# Patient Record
Sex: FEMALE | Race: WHITE | NOT HISPANIC OR LATINO | Employment: FULL TIME | URBAN - METROPOLITAN AREA
[De-identification: names, ages, dates, MRNs, and addresses within clinical notes are randomized per-mention and may not be internally consistent; named-entity substitution may affect disease eponyms.]

---

## 2024-01-13 ENCOUNTER — ANCILLARY PROCEDURE (OUTPATIENT)
Dept: RADIOLOGY | Facility: CLINIC | Age: 49
End: 2024-01-13
Payer: COMMERCIAL

## 2024-01-13 VITALS — WEIGHT: 140 LBS | BODY MASS INDEX: 21.97 KG/M2 | HEIGHT: 67 IN

## 2024-01-13 DIAGNOSIS — Z12.31 ENCOUNTER FOR SCREENING MAMMOGRAM FOR MALIGNANT NEOPLASM OF BREAST: ICD-10-CM

## 2024-01-13 PROCEDURE — 77067 SCR MAMMO BI INCL CAD: CPT

## 2024-02-06 ENCOUNTER — OFFICE VISIT (OUTPATIENT)
Dept: OBSTETRICS AND GYNECOLOGY | Facility: CLINIC | Age: 49
End: 2024-02-06
Payer: COMMERCIAL

## 2024-02-06 VITALS
WEIGHT: 140 LBS | HEIGHT: 67 IN | DIASTOLIC BLOOD PRESSURE: 60 MMHG | BODY MASS INDEX: 21.97 KG/M2 | SYSTOLIC BLOOD PRESSURE: 102 MMHG

## 2024-02-06 DIAGNOSIS — Z01.419 WELL WOMAN EXAM: Primary | ICD-10-CM

## 2024-02-06 PROCEDURE — 99396 PREV VISIT EST AGE 40-64: CPT | Performed by: OBSTETRICS & GYNECOLOGY

## 2024-02-06 PROCEDURE — 1036F TOBACCO NON-USER: CPT | Performed by: OBSTETRICS & GYNECOLOGY

## 2024-02-06 NOTE — PROGRESS NOTES
Subjective   Patient ID: Xenia Ingram is a 48 y.o. female who presents for Well woman visit.  Results  CONVERTED GYN CYTOLOGY (Order [id number])  Result Information    Status Priority Source  Final result (2/13/2023 1400) Routine TPP CERVICAL-Include Genotype    CONVERTED GYN CYTOLOGY  Order: [id number]  Status: Final result       Visible to patient: Yes (seen)    0 Result Notes       1 HM Topic     Component 1 yr ago  Pathology Report     Accession #: [id number]     Date of Procedure:  1/31/2023      Pathologist: [facility], Cytology  Date Reported: 2/13/2023  Date Received:  1/31/2023  Submitting Physician: HOSEA ELY MD                    FINAL CYTOLOGICAL INTERPRETATION        A.  THINPREP PAP CERVICAL:              Specimen Adequacy:       SATISFACTORY FOR EVALUATION.       Quality Indicator: Absence of endocervical/transformation zone component.              General Categorization:       NEGATIVE FOR INTRAEPITHELIAL LESION OR MALIGNANCY.                            HIGH RISK HPV TEST RESULT:                      HPV GENOTYPE  16                      NEGATIVE       HPV GENOTYPE  18                      NEGATIVE       HPV GENOTYPE  OTHER             NEGATIVE              Reference Range: Negative                  Slide(s) initially screened by a Cytotechnologist at [facility], [address] [place] [address]    QC review performed at [facility], [address] [place] [address]    Testing for high-risk (HR) type of human papilloma virus (HPV) is performed by  the Roche romina HPV Test.  The romina HPV Test is a qualitative polymerase chain  reaction that amplifies DNA of HPV16, HPV18 and 12 other high-risk HPV types  (31, 33, 35, 39, 45, 51, 52, 56, 58, 59, 66, and 68) associated with cervical  cancer and its precursor lesions.  A positive result indicates the presence of  HPV DNA due to one or more of the 14 genotypes: 16, 18, 31,  33, 35, 39, 45, 51,  52, 56, 58, 59, 66, and 68. Negative results indicate HPV DNA concentrations  are undetectable or below the pre-set threshold for detection. False negative  results may be associated with unoptimized sampling. A negative HR HPV result  does not exclude the possibility of future cytologic HSIL or underlying CIN2-3  or cancer.    This test is approved for cervical specimens by  the US Food and Drug  Administration. Results of this test should be interpreted in conjunction with  the patient's Pap test results.  Please refer to ASCCP current guidelines for  the use of HPV DNA testing, result interpretation, and patient management.  The performance of this test was verified by the Molecular Diagnostic  Laboratory at [facility]. The lab is  certified under the Clinical Laboratory Amendments of 1988 (CLIA 88) as  qualified to perform high complexity clinical laboratory testing.    This specimen has been analyzed by the Bizimplyp Imaging System ([employer]  an automated imaging and review system, which assists the laboratory in  evaluating cells on ThinPrep Pap tests. Following automated imaging, selected  fields from every slide were reviewed by a cytotechnologist and/or pathologist.      Electronically Signed Out By [facility],  Cytology//JMD/SLD  By the signature on this report, the individual or group listed as making the  Final Interpretation/Diagnosis certifies that they have reviewed this case.  Diagnostic interpretation performed at [facility] [address] [facility] [address]  Educational Note:  Cervical cytology is a screening procedure primarily for squamous cancers and  precursors and has associated false-negative and false-positive results as  evidenced by published data.  Your patient's test should be interpreted in this  context, together with patient's history and clinical findings.  Regular  sampling and  follow-up of unexplained clinical signs and symptoms are  recommended to minimize false negative results.         Clinical History  Date of Last Menstrual Period:     none    Contraceptive History:  IUD  Other Clinical Conditions:  COTEST HPV(Genotype) except for ASC-H, HSIL, Carcinoma - Include HPV Genotype  testing    Clinical Diagnosis History: Cervical smear, as part of routine gynecological  examination - (Z01.419)   Source of Specimen  A: THINPREP PAP CERVICAL           [facility]  Department of Pathology  [id number] [place] [address]    BI mammo bilateral screening tomosynthesis  Status: Final result    Study Result    Narrative & Impression  Interpreted By:  Ernie Silva,   STUDY:  BI MAMMO BILATERAL SCREENING TOMOSYNTHESIS;  1/13/2024 10:10 am      ACCESSION NUMBER(S):  [id number]      ORDERING CLINICIAN:  HOSEA ELY      INDICATION:  Screening.      Based on the Tyrer-Cuzick model for breast cancer risk assessment,  the patient's lifetime risk of breast cancer is 11.1%.      COMPARISON:  2021, 2020, 2019, 2017      FINDINGS:  2D and tomosynthesis images were reviewed at 1 mm slice thickness.      Density:  The breast tissue is heterogeneously dense, which may  obscure small masses.      No suspicious masses or calcifications are identified.      IMPRESSION:  No mammographic evidence of malignancy.      BI-RADS CATEGORY:      BI-RADS Category:  1 Negative.  Recommendation:  Routine Screening Mammogram in 1 Year.  Recommended Date:  1 Year.  Laterality:  Bilateral.      For any future breast imaging appointments, please call [phone]).          MACRO:  None      Signed by: Ernie Silva 1/15/2024 11:08 AM  Dictation workstation:   [id number]    Established patient annual exam.  48 years old.  Healthy relationship.  She has daughter.  He has 2 children.    He is working in a dental office 7 years.  No meds not smoking family history  negative    Mirena since 2019.  Good till 2027.  No significant menopausal issues.  Last Pap 2023 was normal.  Next Pap 2028    Exam breast abdominal pelvic exams all normal.    Well woman exam.  Follow-up 1 year.  Pap from 2023 reviewed.  Recent mammogram normal.  Amenorrheic with Mirena          Review of Systems   All other systems reviewed and are negative.      Objective   Physical Exam  Constitutional:       Appearance: Normal appearance. She is normal weight.   Chest:   Breasts:     Right: Normal.   Genitourinary:     General: Normal vulva.      Vagina: Normal.      Cervix: Normal.      Uterus: Normal.       Adnexa: Right adnexa normal and left adnexa normal.   Neurological:      Mental Status: She is alert.         Assessment/Plan   Well woman exam.  Follow-up 1 year.  Annual mammography.  Mirena good till 2027.  Pap smear in 2028         Hernesto Sherman MD 02/06/24 10:27 AM

## 2024-08-07 ENCOUNTER — APPOINTMENT (OUTPATIENT)
Dept: RADIOLOGY | Facility: HOSPITAL | Age: 49
End: 2024-08-07
Payer: COMMERCIAL

## 2024-08-07 ENCOUNTER — HOSPITAL ENCOUNTER (EMERGENCY)
Facility: HOSPITAL | Age: 49
Discharge: HOME | End: 2024-08-07
Payer: COMMERCIAL

## 2024-08-07 VITALS
RESPIRATION RATE: 18 BRPM | SYSTOLIC BLOOD PRESSURE: 102 MMHG | TEMPERATURE: 98.8 F | DIASTOLIC BLOOD PRESSURE: 53 MMHG | OXYGEN SATURATION: 96 % | HEART RATE: 71 BPM | HEIGHT: 67 IN | WEIGHT: 140 LBS | BODY MASS INDEX: 21.97 KG/M2

## 2024-08-07 DIAGNOSIS — N20.0 KIDNEY STONE: ICD-10-CM

## 2024-08-07 DIAGNOSIS — R10.9 FLANK PAIN: Primary | ICD-10-CM

## 2024-08-07 LAB
ALBUMIN SERPL-MCNC: 4.4 G/DL (ref 3.5–5)
ALP BLD-CCNC: 81 U/L (ref 35–125)
ALT SERPL-CCNC: 27 U/L (ref 5–40)
ANION GAP SERPL CALC-SCNC: 14 MMOL/L
APPEARANCE UR: CLEAR
AST SERPL-CCNC: 27 U/L (ref 5–40)
BASOPHILS # BLD AUTO: 0.04 X10*3/UL (ref 0–0.1)
BASOPHILS NFR BLD AUTO: 0.5 %
BILIRUB SERPL-MCNC: 0.3 MG/DL (ref 0.1–1.2)
BILIRUB UR STRIP.AUTO-MCNC: NEGATIVE MG/DL
BUN SERPL-MCNC: 15 MG/DL (ref 8–25)
CALCIUM SERPL-MCNC: 10.3 MG/DL (ref 8.5–10.4)
CHLORIDE SERPL-SCNC: 103 MMOL/L (ref 97–107)
CO2 SERPL-SCNC: 26 MMOL/L (ref 24–31)
COLOR UR: ABNORMAL
CREAT SERPL-MCNC: 0.9 MG/DL (ref 0.4–1.6)
EGFRCR SERPLBLD CKD-EPI 2021: 79 ML/MIN/1.73M*2
EOSINOPHIL # BLD AUTO: 0.06 X10*3/UL (ref 0–0.7)
EOSINOPHIL NFR BLD AUTO: 0.8 %
ERYTHROCYTE [DISTWIDTH] IN BLOOD BY AUTOMATED COUNT: 12.8 % (ref 11.5–14.5)
GLUCOSE SERPL-MCNC: 93 MG/DL (ref 65–99)
GLUCOSE UR STRIP.AUTO-MCNC: NORMAL MG/DL
HCG UR QL IA.RAPID: NEGATIVE
HCT VFR BLD AUTO: 41.9 % (ref 36–46)
HGB BLD-MCNC: 13.6 G/DL (ref 12–16)
IMM GRANULOCYTES # BLD AUTO: 0.03 X10*3/UL (ref 0–0.7)
IMM GRANULOCYTES NFR BLD AUTO: 0.4 % (ref 0–0.9)
KETONES UR STRIP.AUTO-MCNC: NEGATIVE MG/DL
LEUKOCYTE ESTERASE UR QL STRIP.AUTO: NEGATIVE
LIPASE SERPL-CCNC: 54 U/L (ref 16–63)
LYMPHOCYTES # BLD AUTO: 1.93 X10*3/UL (ref 1.2–4.8)
LYMPHOCYTES NFR BLD AUTO: 25.1 %
MCH RBC QN AUTO: 29.9 PG (ref 26–34)
MCHC RBC AUTO-ENTMCNC: 32.5 G/DL (ref 32–36)
MCV RBC AUTO: 92 FL (ref 80–100)
MONOCYTES # BLD AUTO: 0.66 X10*3/UL (ref 0.1–1)
MONOCYTES NFR BLD AUTO: 8.6 %
MUCOUS THREADS #/AREA URNS AUTO: NORMAL /LPF
NEUTROPHILS # BLD AUTO: 4.97 X10*3/UL (ref 1.2–7.7)
NEUTROPHILS NFR BLD AUTO: 64.6 %
NITRITE UR QL STRIP.AUTO: NEGATIVE
NRBC BLD-RTO: 0 /100 WBCS (ref 0–0)
PH UR STRIP.AUTO: 5.5 [PH]
PLATELET # BLD AUTO: 202 X10*3/UL (ref 150–450)
POTASSIUM SERPL-SCNC: 4 MMOL/L (ref 3.4–5.1)
PROT SERPL-MCNC: 7.2 G/DL (ref 5.9–7.9)
PROT UR STRIP.AUTO-MCNC: NEGATIVE MG/DL
RBC # BLD AUTO: 4.55 X10*6/UL (ref 4–5.2)
RBC # UR STRIP.AUTO: ABNORMAL /UL
RBC #/AREA URNS AUTO: NORMAL /HPF
SODIUM SERPL-SCNC: 143 MMOL/L (ref 133–145)
SP GR UR STRIP.AUTO: 1.02
SQUAMOUS #/AREA URNS AUTO: NORMAL /HPF
UROBILINOGEN UR STRIP.AUTO-MCNC: NORMAL MG/DL
WBC # BLD AUTO: 7.7 X10*3/UL (ref 4.4–11.3)
WBC #/AREA URNS AUTO: NORMAL /HPF

## 2024-08-07 PROCEDURE — 96361 HYDRATE IV INFUSION ADD-ON: CPT | Performed by: PHYSICIAN ASSISTANT

## 2024-08-07 PROCEDURE — 2500000004 HC RX 250 GENERAL PHARMACY W/ HCPCS (ALT 636 FOR OP/ED): Performed by: PHYSICIAN ASSISTANT

## 2024-08-07 PROCEDURE — 96374 THER/PROPH/DIAG INJ IV PUSH: CPT | Performed by: PHYSICIAN ASSISTANT

## 2024-08-07 PROCEDURE — 36415 COLL VENOUS BLD VENIPUNCTURE: CPT | Performed by: PHYSICIAN ASSISTANT

## 2024-08-07 PROCEDURE — 74176 CT ABD & PELVIS W/O CONTRAST: CPT

## 2024-08-07 PROCEDURE — 99284 EMERGENCY DEPT VISIT MOD MDM: CPT | Mod: 25 | Performed by: PHYSICIAN ASSISTANT

## 2024-08-07 PROCEDURE — 83690 ASSAY OF LIPASE: CPT | Performed by: PHYSICIAN ASSISTANT

## 2024-08-07 PROCEDURE — 81001 URINALYSIS AUTO W/SCOPE: CPT | Performed by: PHYSICIAN ASSISTANT

## 2024-08-07 PROCEDURE — 74176 CT ABD & PELVIS W/O CONTRAST: CPT | Performed by: RADIOLOGY

## 2024-08-07 PROCEDURE — 80053 COMPREHEN METABOLIC PANEL: CPT | Performed by: PHYSICIAN ASSISTANT

## 2024-08-07 PROCEDURE — 81025 URINE PREGNANCY TEST: CPT | Performed by: PHYSICIAN ASSISTANT

## 2024-08-07 PROCEDURE — 85025 COMPLETE CBC W/AUTO DIFF WBC: CPT | Performed by: PHYSICIAN ASSISTANT

## 2024-08-07 PROCEDURE — 96375 TX/PRO/DX INJ NEW DRUG ADDON: CPT | Performed by: PHYSICIAN ASSISTANT

## 2024-08-07 RX ORDER — TAMSULOSIN HYDROCHLORIDE 0.4 MG/1
0.4 CAPSULE ORAL DAILY
Qty: 10 CAPSULE | Refills: 0 | Status: SHIPPED | OUTPATIENT
Start: 2024-08-07 | End: 2024-08-07

## 2024-08-07 RX ORDER — ONDANSETRON 4 MG/1
4 TABLET, FILM COATED ORAL EVERY 6 HOURS
Qty: 12 TABLET | Refills: 0 | Status: SHIPPED | OUTPATIENT
Start: 2024-08-07 | End: 2024-08-10

## 2024-08-07 RX ORDER — MORPHINE SULFATE 4 MG/ML
4 INJECTION, SOLUTION INTRAMUSCULAR; INTRAVENOUS ONCE
Status: COMPLETED | OUTPATIENT
Start: 2024-08-07 | End: 2024-08-07

## 2024-08-07 RX ORDER — HYDROCODONE BITARTRATE AND ACETAMINOPHEN 5; 325 MG/1; MG/1
1 TABLET ORAL EVERY 6 HOURS PRN
Qty: 12 TABLET | Refills: 0 | Status: SHIPPED | OUTPATIENT
Start: 2024-08-07 | End: 2024-08-07

## 2024-08-07 RX ORDER — ONDANSETRON 4 MG/1
4 TABLET, FILM COATED ORAL EVERY 6 HOURS
Qty: 12 TABLET | Refills: 0 | Status: SHIPPED | OUTPATIENT
Start: 2024-08-07 | End: 2024-08-07

## 2024-08-07 RX ORDER — IBUPROFEN 600 MG/1
600 TABLET ORAL EVERY 6 HOURS PRN
Qty: 20 TABLET | Refills: 0 | Status: SHIPPED | OUTPATIENT
Start: 2024-08-07 | End: 2024-08-07

## 2024-08-07 RX ORDER — TAMSULOSIN HYDROCHLORIDE 0.4 MG/1
0.4 CAPSULE ORAL DAILY
Qty: 10 CAPSULE | Refills: 0 | Status: SHIPPED | OUTPATIENT
Start: 2024-08-07 | End: 2024-08-17

## 2024-08-07 RX ORDER — HYDROCODONE BITARTRATE AND ACETAMINOPHEN 5; 325 MG/1; MG/1
1 TABLET ORAL EVERY 6 HOURS PRN
Qty: 12 TABLET | Refills: 0 | Status: SHIPPED | OUTPATIENT
Start: 2024-08-07

## 2024-08-07 RX ORDER — IBUPROFEN 600 MG/1
600 TABLET ORAL EVERY 6 HOURS PRN
Qty: 20 TABLET | Refills: 0 | Status: SHIPPED | OUTPATIENT
Start: 2024-08-07

## 2024-08-07 RX ORDER — KETOROLAC TROMETHAMINE 30 MG/ML
30 INJECTION, SOLUTION INTRAMUSCULAR; INTRAVENOUS ONCE
Status: COMPLETED | OUTPATIENT
Start: 2024-08-07 | End: 2024-08-07

## 2024-08-07 RX ORDER — ONDANSETRON HYDROCHLORIDE 2 MG/ML
4 INJECTION, SOLUTION INTRAVENOUS ONCE
Status: COMPLETED | OUTPATIENT
Start: 2024-08-07 | End: 2024-08-07

## 2024-08-07 ASSESSMENT — LIFESTYLE VARIABLES
HAVE YOU EVER FELT YOU SHOULD CUT DOWN ON YOUR DRINKING: NO
TOTAL SCORE: 0
HAVE PEOPLE ANNOYED YOU BY CRITICIZING YOUR DRINKING: NO
EVER HAD A DRINK FIRST THING IN THE MORNING TO STEADY YOUR NERVES TO GET RID OF A HANGOVER: NO
EVER FELT BAD OR GUILTY ABOUT YOUR DRINKING: NO

## 2024-08-07 ASSESSMENT — PAIN SCALES - GENERAL: PAINLEVEL_OUTOF10: 10 - WORST POSSIBLE PAIN

## 2024-08-07 ASSESSMENT — COLUMBIA-SUICIDE SEVERITY RATING SCALE - C-SSRS
2. HAVE YOU ACTUALLY HAD ANY THOUGHTS OF KILLING YOURSELF?: NO
6. HAVE YOU EVER DONE ANYTHING, STARTED TO DO ANYTHING, OR PREPARED TO DO ANYTHING TO END YOUR LIFE?: NO
1. IN THE PAST MONTH, HAVE YOU WISHED YOU WERE DEAD OR WISHED YOU COULD GO TO SLEEP AND NOT WAKE UP?: NO

## 2024-08-07 ASSESSMENT — PAIN - FUNCTIONAL ASSESSMENT: PAIN_FUNCTIONAL_ASSESSMENT: 0-10

## 2024-08-07 NOTE — ED TRIAGE NOTES
Patient reports severe left flank pain getting progressively worse for the past hour.  No history of kidney stones, she has some chills but no fever, N/V.  Patient reports frequency and urgency of urine but no burning.

## 2024-08-07 NOTE — ED PROVIDER NOTES
HPI   Chief Complaint   Patient presents with    Flank Pain     Patient reports severe left flank pain getting progressively worse for the past hour.  No history of kidney stones, she has some chills but no fever, N/V.  Patient reports frequency and urgency of urine but no burning.       48-year-old female presented emergency department with a chief complaint of severe left leg pain that abruptly onset within the last hour.  She has associated nausea.  She is urinary urgency.  She denies lightheadedness dizziness numbness weakness.  All appear nontoxic.  No other complaint.              Patient History   History reviewed. No pertinent past medical history.  History reviewed. No pertinent surgical history.  No family history on file.  Social History     Tobacco Use    Smoking status: Never    Smokeless tobacco: Never   Vaping Use    Vaping status: Never Used   Substance Use Topics    Alcohol use: Not on file    Drug use: Never       Physical Exam   ED Triage Vitals [08/07/24 1711]   Temperature Heart Rate Respirations BP   37.1 °C (98.8 °F) 74 18 142/65      Pulse Ox Temp Source Heart Rate Source Patient Position   100 % Temporal Monitor Sitting      BP Location FiO2 (%)     Left arm --       Physical Exam  Vitals and nursing note reviewed.   Constitutional:       Appearance: Normal appearance.   HENT:      Head: Normocephalic.      Nose: Nose normal.      Mouth/Throat:      Mouth: Mucous membranes are moist.   Cardiovascular:      Rate and Rhythm: Normal rate.      Pulses: Normal pulses.   Pulmonary:      Effort: Pulmonary effort is normal.   Abdominal:      General: Abdomen is flat.      Comments: Tenderness left flank, soft no rebound or guarding   Musculoskeletal:         General: Normal range of motion.   Skin:     General: Skin is warm.   Neurological:      General: No focal deficit present.      Mental Status: She is alert and oriented to person, place, and time.   Psychiatric:         Mood and Affect: Mood  normal.           ED Course & MDM   Diagnoses as of 08/07/24 1935   Flank pain   Kidney stone                 No data recorded     [place] Coma Scale Score: 15 (08/07/24 1711 : Chayito Goodwin RN)                           Medical Decision Making  I have seen and evaluated this patient.  Physician available for consultation.  Vital signs have been reviewed.  All laboratory and diagnostic imaging is reviewed by myself and interpreted by myself unless otherwise stated.  Additionally imaging is interpreted by radiologist.    CBC without significant leukocytosis or anemia, metabolic panel without significant renal impairment or electrolyte abnormality.  Urinalysis negative CT scan with distal ureteral stone.  Pain improved in emergency department.  Discharged with standard kidney stone medications urology referral.  Released in good condition.      Labs Reviewed   URINALYSIS WITH REFLEX CULTURE AND MICROSCOPIC - Abnormal       Result Value    Color, Urine Light-Yellow      Appearance, Urine Clear      Specific Gravity, Urine 1.021      pH, Urine 5.5      Protein, Urine NEGATIVE      Glucose, Urine Normal      Blood, Urine 0.1 (1+) (*)     Ketones, Urine NEGATIVE      Bilirubin, Urine NEGATIVE      Urobilinogen, Urine Normal      Nitrite, Urine NEGATIVE      Leukocyte Esterase, Urine NEGATIVE     COMPREHENSIVE METABOLIC PANEL - Normal    Glucose 93      Sodium 143      Potassium 4.0      Chloride 103      Bicarbonate 26      Urea Nitrogen 15      Creatinine 0.90      eGFR 79      Calcium 10.3      Albumin 4.4      Alkaline Phosphatase 81      Total Protein 7.2      AST 27      Bilirubin, Total 0.3      ALT 27      Anion Gap 14     LIPASE - Normal    Lipase 54     HCG, URINE, QUALITATIVE - Normal    HCG, Urine NEGATIVE     CBC WITH AUTO DIFFERENTIAL    WBC 7.7      nRBC 0.0      RBC 4.55      Hemoglobin 13.6      Hematocrit 41.9      MCV 92      MCH 29.9      MCHC 32.5      RDW 12.8      Platelets 202      Neutrophils %  64.6      Immature Granulocytes %, Automated 0.4      Lymphocytes % 25.1      Monocytes % 8.6      Eosinophils % 0.8      Basophils % 0.5      Neutrophils Absolute 4.97      Immature Granulocytes Absolute, Automated 0.03      Lymphocytes Absolute 1.93      Monocytes Absolute 0.66      Eosinophils Absolute 0.06      Basophils Absolute 0.04     URINALYSIS WITH REFLEX CULTURE AND MICROSCOPIC    Narrative:     The following orders were created for panel order Urinalysis with Reflex Culture and Microscopic.  Procedure                               Abnormality         Status                     ---------                               -----------         ------                     Urinalysis with Reflex C...[[id number]]  Abnormal            Final result               Extra Urine Gray Tube[[id number]]                            In process                   Please view results for these tests on the individual orders.   EXTRA URINE GRAY TUBE   URINALYSIS MICROSCOPIC WITH REFLEX CULTURE    WBC, Urine 1-5      RBC, Urine 3-5      Squamous Epithelial Cells, Urine 1-9 (SPARSE)      Mucus, Urine FEW       CT abdomen pelvis wo IV contrast   Final Result   suggestion of mild left hydroureteronephrosis. There is a 4 x 3 mm   calculus versus phlebolith in or adjacent to the distal left ureter.   If symptoms persist, a follow-up examination can be obtained with   postcontrast imaging.        Interval enlargement of cyst in the upper pole of the left kidney now   measuring 5.0 x 4.9 cm.        Mild-moderate hepatomegaly. No focal hepatic lesions.        Signed by: Prudencio Lewis 8/7/2024 6:56 PM   Dictation workstation:   [id number]        Medications   ketorolac (Toradol) injection 30 mg (30 mg intravenous Given 8/7/24 1822)   morphine injection 4 mg (4 mg intravenous Given 8/7/24 1822)   sodium chloride 0.9 % bolus 1,000 mL (1,000 mL intravenous New Bag 8/7/24 1804)   ondansetron (Zofran) injection 4 mg (4 mg intravenous Given  8/7/24 1821)     New Prescriptions    HYDROCODONE-ACETAMINOPHEN (NORCO) 5-325 MG TABLET    Take 1 tablet by mouth every 6 hours if needed for severe pain (7 - 10) for up to 12 doses.    IBUPROFEN 600 MG TABLET    Take 1 tablet (600 mg) by mouth every 6 hours if needed for mild pain (1 - 3) for up to 20 doses.    ONDANSETRON (ZOFRAN) 4 MG TABLET    Take 1 tablet (4 mg) by mouth every 6 hours for 3 days.    TAMSULOSIN (FLOMAX) 0.4 MG 24 HR CAPSULE    Take 1 capsule (0.4 mg) by mouth once daily for 10 doses.         Procedure  Procedures     Cruzito Mccullough PA-C  08/07/24 1938

## 2024-08-08 LAB — HOLD SPECIMEN: NORMAL

## 2025-02-12 ENCOUNTER — APPOINTMENT (OUTPATIENT)
Dept: OBSTETRICS AND GYNECOLOGY | Facility: CLINIC | Age: 50
End: 2025-02-12
Payer: COMMERCIAL

## 2025-02-12 ENCOUNTER — HOSPITAL ENCOUNTER (OUTPATIENT)
Dept: RADIOLOGY | Facility: CLINIC | Age: 50
Discharge: HOME | End: 2025-02-12
Payer: COMMERCIAL

## 2025-02-12 VITALS
BODY MASS INDEX: 21.97 KG/M2 | DIASTOLIC BLOOD PRESSURE: 76 MMHG | SYSTOLIC BLOOD PRESSURE: 110 MMHG | HEIGHT: 67 IN | WEIGHT: 140 LBS

## 2025-02-12 DIAGNOSIS — Z12.31 SCREENING MAMMOGRAM FOR BREAST CANCER: ICD-10-CM

## 2025-02-12 DIAGNOSIS — Z01.419 WELL WOMAN EXAM: Primary | ICD-10-CM

## 2025-02-12 PROCEDURE — 99396 PREV VISIT EST AGE 40-64: CPT | Performed by: OBSTETRICS & GYNECOLOGY

## 2025-02-12 PROCEDURE — 77063 BREAST TOMOSYNTHESIS BI: CPT | Performed by: RADIOLOGY

## 2025-02-12 PROCEDURE — 77067 SCR MAMMO BI INCL CAD: CPT | Performed by: RADIOLOGY

## 2025-02-12 PROCEDURE — 3008F BODY MASS INDEX DOCD: CPT | Performed by: OBSTETRICS & GYNECOLOGY

## 2025-02-12 PROCEDURE — 77063 BREAST TOMOSYNTHESIS BI: CPT

## 2025-02-12 NOTE — PROGRESS NOTES
Subjective   Patient ID: Xenia Ingram is a 49 y.o. female who presents for Well woman visit.  Review of my annual note from last year,     Hernesto Ely MD  Physician  Obstetrics     Progress Notes     Signed     Encounter Date: 2/6/2024    Signed     Expand All Collapse All       Subjective  Patient ID: Xenia Ingram is a 48 y.o. female who presents for Well woman visit.  Results  CONVERTED GYN CYTOLOGY (Order [id number])  Result Information     StatusPrioritySource  Final result (2/13/2023 1400)RoutineTPP CERVICAL-Include Genotype     CONVERTED GYN CYTOLOGY  Order: [id number]  Status: Final result       Visible to patient: Yes (seen)    0 Result Notes       1 HM Topic                Component1 yr ago  Pathology Report             Accession #: [id number]     Date of Procedure:  1/31/2023      Pathologist: [facility], Cytology  Date Reported: 2/13/2023  Date Received:  1/31/2023  Submitting Physician: HERNESTO ELY MD                     FINAL CYTOLOGICAL INTERPRETATION           A.  THINPREP PAP CERVICAL:              Specimen Adequacy:       SATISFACTORY FOR EVALUATION.       Quality Indicator: Absence of endocervical/transformation zone component.              General Categorization:       NEGATIVE FOR INTRAEPITHELIAL LESION OR MALIGNANCY.                            HIGH RISK HPV TEST RESULT:                      HPV GENOTYPE  16                      NEGATIVE       HPV GENOTYPE  18                      NEGATIVE       HPV GENOTYPE  OTHER             NEGATIVE              Reference Range: Negative                   Slide(s) initially screened by a Cytotechnologist at [facility], [address] [place] [address]     QC review performed at [facility], [address] [place] [address]     Testing for high-risk (HR) type of human papilloma virus (HPV) is performed by  the Roche romina HPV Test.  The romina HPV Test is a qualitative polymerase  chain  reaction that amplifies DNA of HPV16, HPV18 and 12 other high-risk HPV types  (31, 33, 35, 39, 45, 51, 52, 56, 58, 59, 66, and 68) associated with cervical  cancer and its precursor lesions.  A positive result indicates the presence of  HPV DNA due to one or more of the 14 genotypes: 16, 18, 31, 33, 35, 39, 45, 51,  52, 56, 58, 59, 66, and 68. Negative results indicate HPV DNA concentrations  are undetectable or below the pre-set threshold for detection. False negative  results may be associated with unoptimized sampling. A negative HR HPV result  does not exclude the possibility of future cytologic HSIL or underlying CIN2-3  or cancer.     This test is approved for cervical specimens by  the US Food and Drug  Administration. Results of this test should be interpreted in conjunction with  the patient's Pap test results.  Please refer to ASCCP current guidelines for  the use of HPV DNA testing, result interpretation, and patient management.  The performance of this test was verified by the Molecular Diagnostic  Laboratory at [facility]. The lab is  certified under the Clinical Laboratory Amendments of 1988 (CLIA 88) as  qualified to perform high complexity clinical laboratory testing.     This specimen has been analyzed by the HeatSyncPrep Imaging System ([employer]  an automated imaging and review system, which assists the laboratory in  evaluating cells on ThinPrep Pap tests. Following automated imaging, selected  fields from every slide were reviewed by a cytotechnologist and/or pathologist.        Electronically Signed Out By [facility],  Cytology//JMD/SLD  By the signature on this report, the individual or group listed as making the  Final Interpretation/Diagnosis certifies that they have reviewed this case.  Diagnostic interpretation performed at [facility] [address] [facility] [address]  Educational Note:  Cervical cytology  is a screening procedure primarily for squamous cancers and  precursors and has associated false-negative and false-positive results as  evidenced by published data.  Your patient's test should be interpreted in this  context, together with patient's history and clinical findings.  Regular  sampling and follow-up of unexplained clinical signs and symptoms are  recommended to minimize false negative results.           Clinical History  Date of Last Menstrual Period:     none     Contraceptive History:  IUD  Other Clinical Conditions:  COTEST HPV(Genotype) except for ASC-H, HSIL, Carcinoma - Include HPV Genotype  testing     Clinical Diagnosis History: Cervical smear, as part of routine gynecological  examination - (Z01.419)   Source of Specimen  A: THINPREP PAP CERVICAL              [facility]  Department of Pathology  [address] [place] [address]     BI mammo bilateral screening tomosynthesis  Status: Final result     Study Result     Narrative & Impression  Interpreted By:  Ernie Silva,   STUDY:  BI MAMMO BILATERAL SCREENING TOMOSYNTHESIS;  1/13/2024 10:10 am      ACCESSION NUMBER(S):  [id number]      ORDERING CLINICIAN:  HOSEA ELY      INDICATION:  Screening.      Based on the Tyrer-Cuzick model for breast cancer risk assessment,  the patient's lifetime risk of breast cancer is 11.1%.      COMPARISON:  2021, 2020, 2019, 2017      FINDINGS:  2D and tomosynthesis images were reviewed at 1 mm slice thickness.      Density:  The breast tissue is heterogeneously dense, which may  obscure small masses.      No suspicious masses or calcifications are identified.      IMPRESSION:  No mammographic evidence of malignancy.      BI-RADS CATEGORY:      BI-RADS Category:  1 Negative.  Recommendation:  Routine Screening Mammogram in 1 Year.  Recommended Date:  1 Year.  Laterality:  Bilateral.      For any future breast imaging appointments, please call  [phone]  [phone]).          MACRO:  None      Signed by: Ernie Silva 1/15/2024 11:08 AM  Dictation workstation:   [id number]     Established patient annual exam.  48 years old.  Healthy relationship.  She has daughter.  He has 2 children.     He is working in a dental office 7 years.  No meds not smoking family history negative     Mirena since 2019.  Good till 2027.  No significant menopausal issues.  Last Pap 2023 was normal.  Next Pap 2028     Exam breast abdominal pelvic exams all normal.     Well woman exam.  Follow-up 1 year.  Pap from 2023 reviewed.  Recent mammogram normal.  Amenorrheic with Mirena              Review of Systems   All other systems reviewed and are negative.           Objective  Physical Exam  Constitutional:       Appearance: Normal appearance. She is normal weight.   Chest:   Breasts:     Right: Normal.   Genitourinary:     General: Normal vulva.      Vagina: Normal.      Cervix: Normal.      Uterus: Normal.       Adnexa: Right adnexa normal and left adnexa normal.   Neurological:      Mental Status: She is alert.               Assessment/Plan  Well woman exam.  Follow-up 1 year.  Annual mammography.  Mirena good till 2027.  Pap smear in 2028        Hernesto Sherman MD 02/06/24 10:27 AM           49 years old.  No menses.  Mirena in place.  Her main concern today is she has some nocturnal itching between the left labia minora and majora anteriorly.  On exam today it is in the area of the skin fold but I do not see any abnormal lesions.  Recommend topical corticosteroid cream.    Breast abdominal pelvic exams normal.  Well woman exam.  Follow-up 1 year.      Review of Systems   All other systems reviewed and are negative.      Objective   Physical Exam  Constitutional:       Appearance: Normal appearance. She is normal weight.   Chest:   Breasts:     Right: Normal.      Left: Normal.   Genitourinary:     General: Normal vulva.      Vagina: Normal.      Cervix: Normal.      Uterus:  Normal.       Adnexa: Right adnexa normal and left adnexa normal.          Comments: Area of pruritus.  Slight erythema but no fissure or lesions.  Neurological:      Mental Status: She is alert.       Assessment/Plan   Well woman exam.  Mammogram ordered.  Follow-up 1 year.  For area of pruritus will recommend topical corticosteroid cream         Hernesto Sherman MD 02/12/25 8:41 AM

## 2026-02-18 ENCOUNTER — APPOINTMENT (OUTPATIENT)
Dept: OBSTETRICS AND GYNECOLOGY | Facility: CLINIC | Age: 51
End: 2026-02-18
Payer: COMMERCIAL